# Patient Record
Sex: FEMALE | Race: WHITE | NOT HISPANIC OR LATINO | Employment: FULL TIME | URBAN - METROPOLITAN AREA
[De-identification: names, ages, dates, MRNs, and addresses within clinical notes are randomized per-mention and may not be internally consistent; named-entity substitution may affect disease eponyms.]

---

## 2023-05-11 ENCOUNTER — APPOINTMENT (OUTPATIENT)
Dept: LAB | Facility: LAB | Age: 33
End: 2023-05-11
Payer: COMMERCIAL

## 2023-05-12 LAB
ANA PATTERN: ABNORMAL
ANA TITER: ABNORMAL
ANTI-CENTROMERE: 0.4 AI
ANTI-CHROMATIN: <0.2 AI
ANTI-DNA (DS): <1 IU/ML
ANTI-JO-1 IGG: <0.2 AI
ANTI-NUCLEAR ANTIBODY (ANA): POSITIVE
ANTI-RIBOSOMAL P: <0.2 AI
ANTI-RNP: <0.2 AI
ANTI-SCL-70: <0.2 AI
ANTI-SM/RNP: <0.2 AI
ANTI-SM: <0.2 AI
ANTI-SSA: <0.2 AI
ANTI-SSB: <0.2 AI
COMPLEMENT C3 (MG/DL) IN SER/PLAS: 140 MG/DL (ref 87–200)
COMPLEMENT C4 (MG/DL) IN SER/PLAS: 38 MG/DL (ref 10–50)

## 2023-05-19 LAB
BASOPHILS (10*3/UL) IN BLOOD BY AUTOMATED COUNT: 0.05 X10E9/L (ref 0–0.1)
BASOPHILS/100 LEUKOCYTES IN BLOOD BY AUTOMATED COUNT: 0.9 % (ref 0–2)
EOSINOPHILS (10*3/UL) IN BLOOD BY AUTOMATED COUNT: 0.1 X10E9/L (ref 0–0.7)
EOSINOPHILS/100 LEUKOCYTES IN BLOOD BY AUTOMATED COUNT: 1.7 % (ref 0–6)
ERYTHROCYTE DISTRIBUTION WIDTH (RATIO) BY AUTOMATED COUNT: 11.9 % (ref 11.5–14.5)
ERYTHROCYTE MEAN CORPUSCULAR HEMOGLOBIN CONCENTRATION (G/DL) BY AUTOMATED: 32.6 G/DL (ref 32–36)
ERYTHROCYTE MEAN CORPUSCULAR VOLUME (FL) BY AUTOMATED COUNT: 97 FL (ref 80–100)
ERYTHROCYTES (10*6/UL) IN BLOOD BY AUTOMATED COUNT: 4.13 X10E12/L (ref 4–5.2)
HEMATOCRIT (%) IN BLOOD BY AUTOMATED COUNT: 40.2 % (ref 36–46)
HEMOGLOBIN (G/DL) IN BLOOD: 13.1 G/DL (ref 12–16)
IMMATURE GRANULOCYTES/100 LEUKOCYTES IN BLOOD BY AUTOMATED COUNT: 0.9 % (ref 0–0.9)
LEUKOCYTES (10*3/UL) IN BLOOD BY AUTOMATED COUNT: 5.8 X10E9/L (ref 4.4–11.3)
LYMPHOCYTES (10*3/UL) IN BLOOD BY AUTOMATED COUNT: 2.38 X10E9/L (ref 1.2–4.8)
LYMPHOCYTES/100 LEUKOCYTES IN BLOOD BY AUTOMATED COUNT: 41.4 % (ref 13–44)
MONOCYTES (10*3/UL) IN BLOOD BY AUTOMATED COUNT: 0.45 X10E9/L (ref 0.1–1)
MONOCYTES/100 LEUKOCYTES IN BLOOD BY AUTOMATED COUNT: 7.8 % (ref 2–10)
NEUTROPHILS (10*3/UL) IN BLOOD BY AUTOMATED COUNT: 2.72 X10E9/L (ref 1.2–7.7)
NEUTROPHILS/100 LEUKOCYTES IN BLOOD BY AUTOMATED COUNT: 47.3 % (ref 40–80)
PLATELETS (10*3/UL) IN BLOOD AUTOMATED COUNT: 243 X10E9/L (ref 150–450)
RBC MORPHOLOGY IN BLOOD: NORMAL

## 2024-01-10 RX ORDER — FLUTICASONE PROPIONATE 50 MCG
SPRAY, SUSPENSION (ML) NASAL 2 TIMES DAILY
COMMUNITY
Start: 2022-10-23 | End: 2024-06-10 | Stop reason: WASHOUT

## 2024-01-10 RX ORDER — DROSPIRENONE AND ETHINYL ESTRADIOL 0.03MG-3MG
KIT ORAL
COMMUNITY
Start: 2023-04-24

## 2024-01-10 RX ORDER — ESCITALOPRAM OXALATE 10 MG/1
10 TABLET ORAL DAILY
COMMUNITY

## 2024-01-10 RX ORDER — PERPHENAZINE 2 MG
TABLET ORAL
COMMUNITY

## 2024-01-12 ENCOUNTER — LAB (OUTPATIENT)
Dept: LAB | Facility: LAB | Age: 34
End: 2024-01-12
Payer: COMMERCIAL

## 2024-01-12 ENCOUNTER — OFFICE VISIT (OUTPATIENT)
Dept: RHEUMATOLOGY | Facility: CLINIC | Age: 34
End: 2024-01-12
Payer: COMMERCIAL

## 2024-01-12 VITALS
TEMPERATURE: 98.1 F | BODY MASS INDEX: 23.32 KG/M2 | DIASTOLIC BLOOD PRESSURE: 80 MMHG | HEART RATE: 66 BPM | SYSTOLIC BLOOD PRESSURE: 127 MMHG | HEIGHT: 65 IN | WEIGHT: 140 LBS

## 2024-01-12 DIAGNOSIS — M32.9 SYSTEMIC LUPUS ERYTHEMATOSUS, UNSPECIFIED SLE TYPE, UNSPECIFIED ORGAN INVOLVEMENT STATUS (MULTI): ICD-10-CM

## 2024-01-12 DIAGNOSIS — M32.9 SYSTEMIC LUPUS ERYTHEMATOSUS, UNSPECIFIED SLE TYPE, UNSPECIFIED ORGAN INVOLVEMENT STATUS (MULTI): Primary | ICD-10-CM

## 2024-01-12 PROCEDURE — 1036F TOBACCO NON-USER: CPT | Performed by: INTERNAL MEDICINE

## 2024-01-12 PROCEDURE — 99214 OFFICE O/P EST MOD 30 MIN: CPT | Performed by: INTERNAL MEDICINE

## 2024-01-12 PROCEDURE — 81003 URINALYSIS AUTO W/O SCOPE: CPT

## 2024-01-12 RX ORDER — ALBUTEROL SULFATE 90 UG/1
2 AEROSOL, METERED RESPIRATORY (INHALATION) EVERY 6 HOURS PRN
COMMUNITY

## 2024-01-12 RX ORDER — HYDROXYCHLOROQUINE SULFATE 200 MG/1
200 TABLET, FILM COATED ORAL DAILY
Qty: 90 TABLET | Refills: 3 | Status: SHIPPED | OUTPATIENT
Start: 2024-01-12 | End: 2025-01-11

## 2024-01-12 ASSESSMENT — PAIN SCALES - GENERAL: PAINLEVEL: 0-NO PAIN

## 2024-01-13 LAB
APPEARANCE UR: CLEAR
BILIRUB UR STRIP.AUTO-MCNC: NEGATIVE MG/DL
COLOR UR: ABNORMAL
GLUCOSE UR STRIP.AUTO-MCNC: NEGATIVE MG/DL
KETONES UR STRIP.AUTO-MCNC: ABNORMAL MG/DL
LEUKOCYTE ESTERASE UR QL STRIP.AUTO: NEGATIVE
NITRITE UR QL STRIP.AUTO: NEGATIVE
PH UR STRIP.AUTO: 7 [PH]
PROT UR STRIP.AUTO-MCNC: NEGATIVE MG/DL
RBC # UR STRIP.AUTO: NEGATIVE /UL
SP GR UR STRIP.AUTO: 1.01
UROBILINOGEN UR STRIP.AUTO-MCNC: <2 MG/DL

## 2024-01-17 NOTE — PROGRESS NOTES
Informants: Patient and EMR.  PP: 33 year-old female with history of hypothyroidism, Raynaud's phenomena, COVID virus infection (12/2021, and 7/2022), gastric ulcer.  CC: Generalized joint pain.  Solomon: She had been in her usual state of health until approximately 2 years ago when she developed sore throat. She was initially evaluated in the emergency department 10/23/2020 and found to have a negative COVID virus test and negative test for strep throat. She was treated with cough medicine. She was later prescribed a steroid and an inhaler with improvement in the sore throat but she developed diffuse musculoskeletal pain. She has not noted any joint swelling or rashes. However she has a longstanding history of Raynaud's symptoms, neck stiffness, intermittent abdominal discomfort. She has not noted any history of photosensitivity, oral or genital ulcerations, any recent fever or chills, or uveitis symptoms. She has not noted any dry eyes or dry mouth. The musculoskeletal pains tend to recur sporadically and may involve various joints including the hands, knees, shoulders, and more recently involving the distal lower extremities from the knees to her feet. She had laboratory testing that demonstrated a positive KASSY 1: 160.  She apparently noted worsening of the musculoskeletal pains since 11/2023 with some swelling and some of the digits of both hands as well as increased pain involving various joints.  She has morning stiffness.  She has some lower back pain that has been chronic but manageable.  Repeat laboratory testing demonstrated a positive KASSY 1: 640.    PH: Allergies: No known drug allergies; illnesses: Hypothyroidism, gastric ulcer, COVID virus pneumonia (12/20/2021 and 7/20/2022), Raynaud's phenomena, anxiety, history of jaundice; surgeries: Cholecystectomy (2009).  SH: She denies any tobacco, alcohol, or illicit drug use. She is G3, P2 with 1 early gestational miscarriage.  FH: Father has history of  peripheral neuropathy, spinal stenosis, status post lumbar fusion and total knee arthroplasty. Her mother has history of stroke, hyperthyroidism, hypothyroidism, Raynaud's phenomenon, lupus, osteopenia. She has a sister with migraine headaches. She does have a sister who is healthy. She has a son and a daughter both of whom are healthy. She has a maternal grandmother with heart disease. She has a niece with hereditary kidney disease.   PX: She is a well-developed, well-nourished, thin white female. The lungs, heart, abdomen, and extremities are benign. The musculoskeletal examination shows preserved range of motion of the upper and lower extremity joints without joint effusions.There is some soft tissue thickening involving the PIP joint of some of the digits of both hands and slightly involving the MCP joint of the digits of both hands.  There is some tenderness over the lower back and knees. There is tightness in the supraspinatus muscles bilaterally. There is a slight scoliosis of the thoracolumbar spine. The slump test is normal. There is normal muscle strength in the upper and lower extremities.  X-ray lumbar spine (6/26/2019) mild rotary dextroscoliosis. There is mild disc space narrowing at several levels with mild facet hypertrophy involving the lower lumbar spine. There is a surgical clip in the right mid abdomen.  X-ray left hip (6/26/2019) normal.  Laboratory (12/5/2023) KASSY 1: 640, ESR 47, CRP 2.2, WBC 7.38, hemoglobin 13.1, hematocrit 38.8, MCV 94.6, MCHC 33.8, platelets 266,CCP <1, G6PD 227, RF <10, KASSY 1: 40, MUKESH panel negative, (11/26/2022) (11/8/2022) mono negative, Lyme IgG negative, Lyme IgM negative, KASSY 1: 160.  Impression: 33 year-old white female with history of hypothyroidism, Raynaud's phenomena, anxiety, history of COVID virus infection on 2 occasions, family history of Raynaud's phenomena, thyroid problems, lupus, presenting after developing rather diffuse musculoskeletal pains following  developing significant mouth and throat soreness and positive KASSY consistent with autoimmune disorder likely systemic lupus erythematosus with or without rheumatoid arthritis.  Plan: She is to start hydroxychloroquine 200 mg daily.  She is to have laboratory for beta-2 glycoprotein, cardiolipin antibody, ESR, and urinalysis.  She is to return at the next available office appointment.

## 2024-02-19 ENCOUNTER — LAB (OUTPATIENT)
Dept: LAB | Facility: LAB | Age: 34
End: 2024-02-19
Payer: COMMERCIAL

## 2024-02-19 DIAGNOSIS — M32.9 SYSTEMIC LUPUS ERYTHEMATOSUS, UNSPECIFIED (MULTI): Primary | ICD-10-CM

## 2024-02-19 LAB
CHOLEST SERPL-MCNC: 200 MG/DL (ref 0–199)
CHOLESTEROL/HDL RATIO: 3.3
ERYTHROCYTE [SEDIMENTATION RATE] IN BLOOD BY WESTERGREN METHOD: 6 MM/H (ref 0–20)
HDLC SERPL-MCNC: 60.3 MG/DL
LDLC SERPL CALC-MCNC: 99 MG/DL
NON HDL CHOLESTEROL: 140 MG/DL (ref 0–149)
TRIGL SERPL-MCNC: 204 MG/DL (ref 0–149)
VLDL: 41 MG/DL (ref 0–40)

## 2024-02-19 PROCEDURE — 80061 LIPID PANEL: CPT

## 2024-02-19 PROCEDURE — 36415 COLL VENOUS BLD VENIPUNCTURE: CPT

## 2024-02-19 PROCEDURE — 86147 CARDIOLIPIN ANTIBODY EA IG: CPT

## 2024-02-19 PROCEDURE — 86146 BETA-2 GLYCOPROTEIN ANTIBODY: CPT

## 2024-02-19 PROCEDURE — 87624 HPV HI-RISK TYP POOLED RSLT: CPT

## 2024-02-19 PROCEDURE — 88175 CYTOPATH C/V AUTO FLUID REDO: CPT

## 2024-02-19 PROCEDURE — 85652 RBC SED RATE AUTOMATED: CPT

## 2024-02-20 ENCOUNTER — LAB REQUISITION (OUTPATIENT)
Dept: LAB | Facility: HOSPITAL | Age: 34
End: 2024-02-20
Payer: COMMERCIAL

## 2024-02-20 DIAGNOSIS — Z11.51 ENCOUNTER FOR SCREENING FOR HUMAN PAPILLOMAVIRUS (HPV): ICD-10-CM

## 2024-02-20 DIAGNOSIS — Z01.419 ENCOUNTER FOR GYNECOLOGICAL EXAMINATION (GENERAL) (ROUTINE) WITHOUT ABNORMAL FINDINGS: ICD-10-CM

## 2024-02-20 LAB
B2 GLYCOPROT1 IGA SER-ACNC: <0.6 U/ML
B2 GLYCOPROT1 IGG SER-ACNC: <1.4 U/ML
B2 GLYCOPROT1 IGM SER-ACNC: 1.5 U/ML
CARDIOLIPIN IGA SERPL-ACNC: <0.5 APL U/ML
CARDIOLIPIN IGG SER IA-ACNC: <1.6 GPL U/ML
CARDIOLIPIN IGM SER IA-ACNC: 0.7 MPL U/ML

## 2024-02-29 LAB
CYTOLOGY CMNT CVX/VAG CYTO-IMP: NORMAL
HPV HR 12 DNA GENITAL QL NAA+PROBE: NEGATIVE
HPV HR GENOTYPES PNL CVX NAA+PROBE: NEGATIVE
HPV16 DNA SPEC QL NAA+PROBE: NEGATIVE
HPV18 DNA SPEC QL NAA+PROBE: NEGATIVE
LAB AP HPV GENOTYPE QUESTION: YES
LAB AP HPV HR: NORMAL
LABORATORY COMMENT REPORT: NORMAL
PATH REPORT.TOTAL CANCER: NORMAL

## 2024-06-10 ENCOUNTER — OFFICE VISIT (OUTPATIENT)
Dept: RHEUMATOLOGY | Facility: CLINIC | Age: 34
End: 2024-06-10
Payer: COMMERCIAL

## 2024-06-10 VITALS
BODY MASS INDEX: 22.99 KG/M2 | WEIGHT: 138 LBS | SYSTOLIC BLOOD PRESSURE: 123 MMHG | HEART RATE: 56 BPM | TEMPERATURE: 98.2 F | DIASTOLIC BLOOD PRESSURE: 75 MMHG | HEIGHT: 65 IN

## 2024-06-10 DIAGNOSIS — M32.9 SYSTEMIC LUPUS ERYTHEMATOSUS, UNSPECIFIED SLE TYPE, UNSPECIFIED ORGAN INVOLVEMENT STATUS (MULTI): Primary | ICD-10-CM

## 2024-06-10 PROCEDURE — 99214 OFFICE O/P EST MOD 30 MIN: CPT | Performed by: INTERNAL MEDICINE

## 2024-06-10 RX ORDER — OLOPATADINE HYDROCHLORIDE 1 MG/ML
SOLUTION/ DROPS OPHTHALMIC
COMMUNITY
Start: 2024-05-09

## 2024-06-10 RX ORDER — BUDESONIDE 90 UG/1
2 AEROSOL, POWDER RESPIRATORY (INHALATION) 2 TIMES DAILY
COMMUNITY
Start: 2024-05-14

## 2024-06-10 RX ORDER — TRIAMCINOLONE ACETONIDE 1 MG/G
OINTMENT TOPICAL
COMMUNITY
Start: 2024-05-09

## 2024-06-10 RX ORDER — TRIAMCINOLONE ACETONIDE 55 UG/1
SPRAY, METERED NASAL
COMMUNITY
Start: 2024-05-09

## 2024-06-10 ASSESSMENT — PAIN SCALES - GENERAL: PAINLEVEL: 3

## 2024-06-10 NOTE — PROGRESS NOTES
She complains of having small slightly bumpy rash on her arms and trunk occurring 3 days ago and have been gradually decreasing.  She had pneumococcal vaccination done 4 days ago.  The following day she developed a rash.  She has been having tenderness in the occipital scalp as well as discomfort behind her eyes without any vision disturbance.  The scalp tenderness and discomfort have been present for prolonged period of time.  There is no change in the or occipital scalp symptoms with neck movement.  She has not noted any shortness of breath or angioedema symptoms. She continues to take hydroxychloroquine 200 mg 3 days/week.      There is no tenderness over the frontal region.  There is slight tenderness in the right occipital region.  The sclera not injected.  There is normal active range of motion of the neck.  There is normal passive range of motion of the upper and lower extremity joints without joint effusions.  The integument is remarkable for multiple tiny slightly erythematous papules on the abdomen and sparsely scattered on the forearms.  Tiny areas of faint erythema on the anterior aspect of the hard palate.  The lungs, heart, abdomen, and extremities are benign.    Laboratory (5/9/2024) WBC 4.46, hemoglobin 11.4, hematocrit 34.7, MCV 94.0, MCHC 36.9, platelets 198, BUN 9, creatinine 0.72, glucose 92, calcium 9.3, AST 27, ALT 20, alk phos 639, albumin 4.2, CRP <0.5.    She has history of hypothyroidism, COVID virus infection (12/20/2021 and 7/20/2022), remote gastric ulcer, positive KASSY with negative MUKESH panel, hypercholesterolemia.  She has left foot bulbar discomfort as well as tenderness in the posterior scalp question possible cervical radiculopathy or cervical muscle strain.  She has systemic lupus erythematosus with sun sensitivity, positive KASSY. Question allergic reaction to the pneumococcal vaccine.    She is to do stretching exercises to the neck.  She is to continue hydroxychloroquine 200 mg  Monday Wednesday and Friday.  Consider radiographs of the neck and head CT scan if persists.  She is to return if next available office appointment.

## 2024-07-15 ENCOUNTER — LAB (OUTPATIENT)
Dept: LAB | Facility: LAB | Age: 34
End: 2024-07-15
Payer: COMMERCIAL

## 2024-07-15 DIAGNOSIS — B99.9 UNSPECIFIED INFECTIOUS DISEASE: Primary | ICD-10-CM

## 2024-07-15 PROCEDURE — 36415 COLL VENOUS BLD VENIPUNCTURE: CPT

## 2024-07-15 PROCEDURE — 86317 IMMUNOASSAY INFECTIOUS AGENT: CPT

## 2024-07-18 LAB
S PN DA SERO 19F IGG SER-MCNC: 22.79 UG/ML
S PNEUM DA 1 IGG SER-MCNC: 11.81 UG/ML
S PNEUM DA 10A IGG SER-MCNC: >29.06 UG/ML
S PNEUM DA 11A IGG SER-MCNC: >3.45 UG/ML
S PNEUM DA 12F IGG SER-MCNC: 0.91 UG/ML
S PNEUM DA 14 IGG SER-MCNC: >35.84 UG/ML
S PNEUM DA 15B IGG SER-MCNC: 13.76 UG/ML
S PNEUM DA 17F IGG SER-MCNC: >11.68 UG/ML
S PNEUM DA 18C IGG SER-MCNC: 3.58 UG/ML
S PNEUM DA 19A IGG SER-MCNC: 16.03 UG/ML
S PNEUM DA 2 IGG SER-MCNC: >27.3 UG/ML
S PNEUM DA 20A IGG SER-MCNC: >19.06 UG/ML
S PNEUM DA 22F IGG SER-MCNC: 0.93 UG/ML
S PNEUM DA 23F IGG SER-MCNC: 4.47 UG/ML
S PNEUM DA 3 IGG SER-MCNC: 5.05 UG/ML
S PNEUM DA 33F IGG SER-MCNC: 3.95 UG/ML
S PNEUM DA 4 IGG SER-MCNC: 4.88 UG/ML
S PNEUM DA 5 IGG SER-MCNC: 3.81 UG/ML
S PNEUM DA 6B IGG SER-MCNC: 1.67 UG/ML
S PNEUM DA 7F IGG SER-MCNC: >27.96 UG/ML
S PNEUM DA 8 IGG SER-MCNC: 3.02 UG/ML
S PNEUM DA 9N IGG SER-MCNC: 9.56 UG/ML
S PNEUM DA 9V IGG SER-MCNC: 9.61 UG/ML
S PNEUM SEROTYPE IGG SER-IMP: NORMAL

## 2024-10-16 ENCOUNTER — LAB (OUTPATIENT)
Dept: LAB | Facility: LAB | Age: 34
End: 2024-10-16
Payer: COMMERCIAL

## 2024-10-16 DIAGNOSIS — M32.9 SYSTEMIC LUPUS ERYTHEMATOSUS, UNSPECIFIED SLE TYPE, UNSPECIFIED ORGAN INVOLVEMENT STATUS (MULTI): ICD-10-CM

## 2024-10-16 LAB
BASOPHILS # BLD AUTO: 0.05 X10*3/UL (ref 0–0.1)
BASOPHILS NFR BLD AUTO: 0.9 %
CRP SERPL-MCNC: 0.3 MG/DL
EOSINOPHIL # BLD AUTO: 0.13 X10*3/UL (ref 0–0.7)
EOSINOPHIL NFR BLD AUTO: 2.3 %
ERYTHROCYTE [DISTWIDTH] IN BLOOD BY AUTOMATED COUNT: 11.4 % (ref 11.5–14.5)
HCT VFR BLD AUTO: 36.6 % (ref 36–46)
HGB BLD-MCNC: 12 G/DL (ref 12–16)
IMM GRANULOCYTES # BLD AUTO: 0.01 X10*3/UL (ref 0–0.7)
IMM GRANULOCYTES NFR BLD AUTO: 0.2 % (ref 0–0.9)
LYMPHOCYTES # BLD AUTO: 2.21 X10*3/UL (ref 1.2–4.8)
LYMPHOCYTES NFR BLD AUTO: 38.8 %
MCH RBC QN AUTO: 31.7 PG (ref 26–34)
MCHC RBC AUTO-ENTMCNC: 32.8 G/DL (ref 32–36)
MCV RBC AUTO: 97 FL (ref 80–100)
MONOCYTES # BLD AUTO: 0.49 X10*3/UL (ref 0.1–1)
MONOCYTES NFR BLD AUTO: 8.6 %
NEUTROPHILS # BLD AUTO: 2.8 X10*3/UL (ref 1.2–7.7)
NEUTROPHILS NFR BLD AUTO: 49.2 %
NRBC BLD-RTO: 0 /100 WBCS (ref 0–0)
PLATELET # BLD AUTO: 217 X10*3/UL (ref 150–450)
RBC # BLD AUTO: 3.78 X10*6/UL (ref 4–5.2)
WBC # BLD AUTO: 5.7 X10*3/UL (ref 4.4–11.3)

## 2024-10-16 PROCEDURE — 86140 C-REACTIVE PROTEIN: CPT

## 2024-10-16 PROCEDURE — 36415 COLL VENOUS BLD VENIPUNCTURE: CPT

## 2024-10-16 PROCEDURE — 85025 COMPLETE CBC W/AUTO DIFF WBC: CPT

## 2024-10-16 PROCEDURE — 86235 NUCLEAR ANTIGEN ANTIBODY: CPT

## 2024-10-16 PROCEDURE — 86038 ANTINUCLEAR ANTIBODIES: CPT

## 2024-10-16 PROCEDURE — 86225 DNA ANTIBODY NATIVE: CPT

## 2024-10-17 LAB
ANA PATTERN: ABNORMAL
ANA SER QL HEP2 SUBST: POSITIVE
ANA TITR SER IF: ABNORMAL {TITER}
CENTROMERE B AB SER-ACNC: 0.2 AI
CHROMATIN AB SERPL-ACNC: <0.2 AI
DSDNA AB SER-ACNC: <1 IU/ML
ENA JO1 AB SER QL IA: <0.2 AI
ENA RNP AB SER IA-ACNC: <0.2 AI
ENA SCL70 AB SER QL IA: <0.2 AI
ENA SM AB SER IA-ACNC: <0.2 AI
ENA SM+RNP AB SER QL IA: <0.2 AI
ENA SS-A AB SER IA-ACNC: <0.2 AI
ENA SS-B AB SER IA-ACNC: <0.2 AI
RIBOSOMAL P AB SER-ACNC: <0.2 AI

## 2024-12-23 ENCOUNTER — APPOINTMENT (OUTPATIENT)
Dept: RHEUMATOLOGY | Facility: CLINIC | Age: 34
End: 2024-12-23
Payer: COMMERCIAL

## 2024-12-23 VITALS
HEART RATE: 62 BPM | TEMPERATURE: 98.3 F | DIASTOLIC BLOOD PRESSURE: 81 MMHG | BODY MASS INDEX: 22.76 KG/M2 | HEIGHT: 65 IN | WEIGHT: 136.6 LBS | SYSTOLIC BLOOD PRESSURE: 135 MMHG | OXYGEN SATURATION: 98 %

## 2024-12-23 DIAGNOSIS — M32.9 SYSTEMIC LUPUS ERYTHEMATOSUS, UNSPECIFIED SLE TYPE, UNSPECIFIED ORGAN INVOLVEMENT STATUS (MULTI): ICD-10-CM

## 2024-12-23 PROCEDURE — 3008F BODY MASS INDEX DOCD: CPT | Performed by: INTERNAL MEDICINE

## 2024-12-23 PROCEDURE — 99213 OFFICE O/P EST LOW 20 MIN: CPT | Performed by: INTERNAL MEDICINE

## 2024-12-23 RX ORDER — HYDROXYCHLOROQUINE SULFATE 200 MG/1
200 TABLET, FILM COATED ORAL DAILY
Qty: 90 TABLET | Refills: 3 | Status: SHIPPED | OUTPATIENT
Start: 2024-12-23 | End: 2025-12-23

## 2024-12-23 ASSESSMENT — PAIN SCALES - GENERAL: PAINLEVEL_OUTOF10: 0-NO PAIN

## 2024-12-23 NOTE — PROGRESS NOTES
For follow-up evaluation with complaints of some discomfort in the base of the left first toe with standing or walking.  She also notes some discomfort in the fourth digit of the right hand in the mornings with locking sensation.  2 months ago in 10/2024, she had episode of right upper chest pain as well as mouth and nose sores that lasted for 2 weeks then subsided without any additional therapy.  She has had similar symptoms approximately 1 year earlier.  She does not recall left chest pain.  The relationship to changes in posture or changes in respiration.    She has a thin body habitus.  The lungs, heart, abdomen, and extremities are benign.  The musculoskeletal examination shows good passive range of motion of the upper and lower extremity joints without joint effusions.  There is slight tenderness at the PIP joint of the fourth digit of the right hand without locking or triggering currently.  There is slight tenderness at the left first MTP joint.  There is hyper extension of the digits of both hands.  There is no skin laxity.    Laboratory (10/16/2024) KASSY 1: 80, MUKESH panel negative, WBC 5.7, hemoglobin 12.0, hematocrit 36.6, MCV 97, MCHC 32.8, platelets 217, CRP 0.30.    She has history of hypothyroidism, COVID virus infection (12/20/2021 and 7/20/2022), remote gastric ulcer, systemic lupus with sun sensitivity and positive KASSY with negative MUKESH panel, mucosal ulcerations, and hypercholesterolemia.    She is to continue hydroxychloroquine 200 mg Monday Wednesday and Friday.  She is to return at the next available office appointment.  She is to have ophthalmology evaluation for follow-up of hydroxychloroquine therapy.

## 2025-04-16 ENCOUNTER — APPOINTMENT (OUTPATIENT)
Facility: CLINIC | Age: 35
End: 2025-04-16
Payer: COMMERCIAL

## 2025-04-16 VITALS
DIASTOLIC BLOOD PRESSURE: 80 MMHG | SYSTOLIC BLOOD PRESSURE: 126 MMHG | HEIGHT: 65 IN | BODY MASS INDEX: 21.99 KG/M2 | WEIGHT: 132 LBS

## 2025-04-16 DIAGNOSIS — G43.831 INTRACTABLE MENSTRUAL MIGRAINE WITH STATUS MIGRAINOSUS: ICD-10-CM

## 2025-04-16 DIAGNOSIS — Z01.419 WELL WOMAN EXAM WITH ROUTINE GYNECOLOGICAL EXAM: Primary | ICD-10-CM

## 2025-04-16 PROCEDURE — 1036F TOBACCO NON-USER: CPT | Performed by: OBSTETRICS & GYNECOLOGY

## 2025-04-16 PROCEDURE — 99395 PREV VISIT EST AGE 18-39: CPT | Performed by: OBSTETRICS & GYNECOLOGY

## 2025-04-16 PROCEDURE — 3008F BODY MASS INDEX DOCD: CPT | Performed by: OBSTETRICS & GYNECOLOGY

## 2025-04-16 RX ORDER — ETONOGESTREL AND ETHINYL ESTRADIOL VAGINAL RING .015; .12 MG/D; MG/D
1 RING VAGINAL
Qty: 17 EACH | Refills: 0 | Status: SHIPPED | OUTPATIENT
Start: 2025-04-16 | End: 2026-04-16

## 2025-04-16 ASSESSMENT — COLUMBIA-SUICIDE SEVERITY RATING SCALE - C-SSRS
6. HAVE YOU EVER DONE ANYTHING, STARTED TO DO ANYTHING, OR PREPARED TO DO ANYTHING TO END YOUR LIFE?: NO
2. HAVE YOU ACTUALLY HAD ANY THOUGHTS OF KILLING YOURSELF?: NO
1. IN THE PAST MONTH, HAVE YOU WISHED YOU WERE DEAD OR WISHED YOU COULD GO TO SLEEP AND NOT WAKE UP?: NO

## 2025-04-16 ASSESSMENT — PATIENT HEALTH QUESTIONNAIRE - PHQ9
1. LITTLE INTEREST OR PLEASURE IN DOING THINGS: NOT AT ALL
2. FEELING DOWN, DEPRESSED OR HOPELESS: NOT AT ALL
SUM OF ALL RESPONSES TO PHQ9 QUESTIONS 1 AND 2: 0

## 2025-04-16 NOTE — PROGRESS NOTES
Annual [facility] Women History and Physical  Subjective   Mandy Bruce is a 35 y.o. female who is here for a routine exam. Periods are  absent 2/2 continous therapy , she does have migraines without aura, and will get a migraine if she misses a pill.  She is interested in potentially trying nuvaring to avoid this  No intermenstrual bleeding, spotting, or discharge.  She is sexually active and uses birthcontrol: a sterilization procedure Vasectomy for pregnancy prevention  She currently  has spotting x 2 wks on continuous christiane.   [facility] Women Screening history:  Cervical  2024 NORMAL, HPV NEG  Breast  NONE  Colon  No FH    Obstetrical History   OB History    Para Term  AB Living   3 2 2  1 2   SAB IAB Ectopic Multiple Live Births   1    2      # Outcome Date GA Lbr Cm/2nd Weight Sex Type Anes PTL Lv   3 Term 05/15/20 40w0d  3.118 kg M Vag-Spont EPI N NOAH   2 Term 18 40w0d  3.629 kg F Vag-Spont EPI  NOAH   1 SAB                Past Medical History  Past Medical History:   Diagnosis Date    Low back pain     Lupus     Lupus anticoagulant disorder (Multi)     Tendinitis ?        Past Surgical History   Past Surgical History:   Procedure Laterality Date    CHOLECYSTECTOMY         Social History  Social History     Tobacco Use    Smoking status: Never    Smokeless tobacco: Never   Substance Use Topics    Alcohol use: Never     Substance and Sexual Activity   Drug Use Never       Allergies  House dust and Mold     Medications  Current Outpatient Medications on File Prior to Visit   Medication Sig Dispense Refill    albuterol 90 mcg/actuation inhaler Inhale 2 puffs every 6 hours if needed for wheezing.      drospirenone-ethinyl estradioL (Christiane, Ocella) 3-0.03 mg tablet 1 (one) Tablet daily, skipping placebos. pt takes continuously      escitalopram (Lexapro) 10 mg tablet Take 1 tablet (10 mg) by mouth once daily.      fish,bora,flax oils-om3,6,9no1 (Omega 3-6-9) 1,200 mg capsule Take by mouth.       "hydroxychloroquine (Plaquenil) 200 mg tablet Take 1 tablet (200 mg) by mouth once daily. 90 tablet 3    olopatadine (Patanol) 0.1 % ophthalmic solution instill 1 drop into each eye TWICE DAILY      triamcinolone (Kenalog) 0.1 % ointment APPLY TO THE AFFECTED AREA(S) TWICE DAILY AS NEEDED -- do not use more than 14 consecutive days      triamcinolone (Nasacort) 55 mcg nasal inhaler use 2 (TWO) sprays nasally daily      [DISCONTINUED] Pulmicort Flexhaler 90 mcg/actuation inhaler Inhale 2 puffs 2 times a day.       No current facility-administered medications on file prior to visit.     REVIEW OF SYSTEMS  Constitutional: no fever, no chills, no recent weight gain, no recent weight loss and no fatigue.   Eyes: no eye pain, no vision problems and no dryness of the eyes.   ENT: no hearing loss, no nosebleeds and no sinus congestion.   Cardiovascular: no chest pain, no palpitations and no orthopnea.   Respiratory: no shortness of breath, no cough and no wheezing.   Gastrointestinal: no abdominal pain, no constipation, no nausea, no diarrhea and no vomiting.   Genitourinary: no dysuria, no urinary incontinence, no vaginal dryness, no vaginal itching, no dyspareunia, no pelvic pain, no dysmenorrhea, no sexual problems, no change in urinary frequency, no vaginal discharge, no unexplained vaginal bleeding and no lesion/sore.   Musculoskeletal: no back pain, no joint swelling and no leg edema.   Integumentary: no rashes, no skin lesions, no nipple discharge, no breast pain and no breast lump.   Neurological: no headache, no numbness and no dizziness.   Psychiatric: no sleep disturbances, no anxiety and no depression.   Endocrine: no hot flashes, no loss of hair and no hirsutism.   Hematologic/Lymphatic: no swollen glands, no tendency for easy bleeding and no tendency for easy bruising.       Objective   PHYSICAL EXAM  /80   Ht 1.651 m (5' 5\")   Wt 59.9 kg (132 lb)   LMP 04/01/2025 (Approximate)   BMI 21.97 kg/m² "     General:   Psychiatric:  Alert and oriented x 3   Appropriate mood and affect   Heart:  Thyroid: Regular rate, rhythm  Euthyroid, normal shape and size   Lungs:  Breast: Clear to auscultation bilaterally  Symmetrical, no skin changes/nipple discharge, redness, tenderness, no masses palpated bilaterally   Abdomen: Soft, non tender   Vulva: EGBUS normal, no lesions   Vagina: Pink, normal discharge   Cervix: No CMT   Uterus: Normal shape, size   Adnexa:  Perineum/Perianal:  Extremities: NT bilaterally  No skin Lesions, Hemorrhoids  Full ROM, No edema     Assessment/Plan   Mandy Bruce is a 35 y.o. female presents for well women exam and no new problems or risk factors were identified.  Discussed current recommendations for cervical cancer screening and breast cancer screening including self breast awareness and mammography.  Diet , exercise and daily vitamin encouraged.    She uses patrizia for her menstrual migraines, without aura.  She does note spotting if she misses a pill. she is interested in potentially trying nuvaring to avoid this    Diagnoses and all orders for this visit:  Well woman exam with routine gynecological exam  Intractable menstrual migraine with status migrainosus  -     etonogestreL-ethinyl estradioL (Nuvaring) 0.12-0.015 mg/24 hr vaginal ring; Insert 1 each into the vagina every 21 (twenty-one) days. Insert vaginal ring for 3 weeks, continuous therapy      Alfredito Duran MD     Thank you for coming to see me for your visit today and most importantly thank you for having a preventative visit.  If lab work was sent, you will see your test result via [facility].  Feel free to call and discuss results you can do not understand.  Any prescriptions will be sent electronically to your pharmacy listed    I look forward to seeing you next year for your health care needs.  Please remember:   Sleep is important - make it a priority for at least 6 hours per night!   Exercise such as walking for 20  minutes per day is beneficial to your physical and mental health   Healthy habits lead to a healthy life.

## 2025-05-12 DIAGNOSIS — M32.9 SYSTEMIC LUPUS ERYTHEMATOSUS, UNSPECIFIED SLE TYPE, UNSPECIFIED ORGAN INVOLVEMENT STATUS (MULTI): Primary | ICD-10-CM

## 2025-05-12 DIAGNOSIS — R21 RASH: ICD-10-CM

## 2025-05-12 DIAGNOSIS — M32.9 SYSTEMIC LUPUS ERYTHEMATOSUS, UNSPECIFIED SLE TYPE, UNSPECIFIED ORGAN INVOLVEMENT STATUS (MULTI): ICD-10-CM

## 2025-05-12 RX ORDER — PREDNISONE 10 MG/1
10 TABLET ORAL DAILY
Qty: 10 TABLET | Refills: 0 | Status: SHIPPED | OUTPATIENT
Start: 2025-05-12 | End: 2025-05-13 | Stop reason: SDUPTHER

## 2025-05-13 DIAGNOSIS — R21 RASH: ICD-10-CM

## 2025-05-13 LAB
ALBUMIN SERPL-MCNC: 3.9 G/DL (ref 3.6–5.1)
ALP SERPL-CCNC: 48 U/L (ref 31–125)
ALT SERPL-CCNC: 18 U/L (ref 6–29)
ANION GAP SERPL CALCULATED.4IONS-SCNC: 10 MMOL/L (CALC) (ref 7–17)
AST SERPL-CCNC: 17 U/L (ref 10–30)
BASOPHILS # BLD AUTO: 41 CELLS/UL (ref 0–200)
BASOPHILS NFR BLD AUTO: 0.9 %
BILIRUB SERPL-MCNC: 0.3 MG/DL (ref 0.2–1.2)
BUN SERPL-MCNC: 9 MG/DL (ref 7–25)
CALCIUM SERPL-MCNC: 9 MG/DL (ref 8.6–10.2)
CHLORIDE SERPL-SCNC: 104 MMOL/L (ref 98–110)
CO2 SERPL-SCNC: 24 MMOL/L (ref 20–32)
CREAT SERPL-MCNC: 0.74 MG/DL (ref 0.5–0.97)
CRP SERPL-MCNC: 12.6 MG/L
EGFRCR SERPLBLD CKD-EPI 2021: 109 ML/MIN/1.73M2
EOSINOPHIL # BLD AUTO: 81 CELLS/UL (ref 15–500)
EOSINOPHIL NFR BLD AUTO: 1.8 %
ERYTHROCYTE [DISTWIDTH] IN BLOOD BY AUTOMATED COUNT: 11.7 % (ref 11–15)
GLUCOSE SERPL-MCNC: 84 MG/DL (ref 65–99)
HCT VFR BLD AUTO: 34.2 % (ref 35–45)
HGB BLD-MCNC: 11.5 G/DL (ref 11.7–15.5)
LYMPHOCYTES # BLD AUTO: 1445 CELLS/UL (ref 850–3900)
LYMPHOCYTES NFR BLD AUTO: 32.1 %
MCH RBC QN AUTO: 31.7 PG (ref 27–33)
MCHC RBC AUTO-ENTMCNC: 33.6 G/DL (ref 32–36)
MCV RBC AUTO: 94.2 FL (ref 80–100)
MONOCYTES # BLD AUTO: 342 CELLS/UL (ref 200–950)
MONOCYTES NFR BLD AUTO: 7.6 %
NEUTROPHILS # BLD AUTO: 2592 CELLS/UL (ref 1500–7800)
NEUTROPHILS NFR BLD AUTO: 57.6 %
PLATELET # BLD AUTO: 176 THOUSAND/UL (ref 140–400)
PMV BLD REES-ECKER: 11.9 FL (ref 7.5–12.5)
POTASSIUM SERPL-SCNC: 4 MMOL/L (ref 3.5–5.3)
PROT SERPL-MCNC: 6.9 G/DL (ref 6.1–8.1)
RBC # BLD AUTO: 3.63 MILLION/UL (ref 3.8–5.1)
SODIUM SERPL-SCNC: 138 MMOL/L (ref 135–146)
WBC # BLD AUTO: 4.5 THOUSAND/UL (ref 3.8–10.8)

## 2025-05-13 RX ORDER — PREDNISONE 10 MG/1
10 TABLET ORAL DAILY
Qty: 10 TABLET | Refills: 0 | Status: SHIPPED | OUTPATIENT
Start: 2025-05-13 | End: 2025-05-23

## 2025-05-15 LAB
ANA PAT SER IF-IMP: ABNORMAL
ANA SER QL IF: POSITIVE
ANA TITR SER IF: ABNORMAL TITER
C3 SERPL-MCNC: 94 MG/DL (ref 83–193)
C4 SERPL-MCNC: 19 MG/DL (ref 15–57)
CENTROMERE B AB SER-ACNC: ABNORMAL AI
DSDNA AB SER-ACNC: 8 IU/ML
ENA JO1 AB SER IA-ACNC: ABNORMAL AI
ENA RNP AB SER-ACNC: ABNORMAL AI
ENA SCL70 AB SER IA-ACNC: ABNORMAL AI
ENA SM AB SER IA-ACNC: ABNORMAL AI
ENA SM+RNP AB SER IA-ACNC: ABNORMAL AI
ENA SS-A AB SER IA-ACNC: ABNORMAL AI
ENA SS-B AB SER IA-ACNC: ABNORMAL AI
LABORATORY COMMENT REPORT: ABNORMAL
NUCLEOSOME AB SER IA-ACNC: ABNORMAL AI
RIBOSOMAL P AB SER-ACNC: ABNORMAL AI

## 2025-05-15 NOTE — PROGRESS NOTES
Patient contacted via My Chart to make aware of provider's instruction to try Zyrtec or Allegra for skin rash. No other new instruction at this time.

## 2025-07-01 ENCOUNTER — APPOINTMENT (OUTPATIENT)
Dept: RHEUMATOLOGY | Facility: CLINIC | Age: 35
End: 2025-07-01
Payer: COMMERCIAL

## 2025-07-01 VITALS
OXYGEN SATURATION: 98 % | HEART RATE: 62 BPM | SYSTOLIC BLOOD PRESSURE: 142 MMHG | HEIGHT: 66 IN | BODY MASS INDEX: 23.18 KG/M2 | TEMPERATURE: 98.6 F | WEIGHT: 144.2 LBS | DIASTOLIC BLOOD PRESSURE: 84 MMHG

## 2025-07-01 DIAGNOSIS — M32.9 SYSTEMIC LUPUS ERYTHEMATOSUS, UNSPECIFIED SLE TYPE, UNSPECIFIED ORGAN INVOLVEMENT STATUS (MULTI): Primary | ICD-10-CM

## 2025-07-01 PROCEDURE — 99213 OFFICE O/P EST LOW 20 MIN: CPT | Performed by: INTERNAL MEDICINE

## 2025-07-01 PROCEDURE — 1036F TOBACCO NON-USER: CPT | Performed by: INTERNAL MEDICINE

## 2025-07-01 PROCEDURE — 3008F BODY MASS INDEX DOCD: CPT | Performed by: INTERNAL MEDICINE

## 2025-07-01 ASSESSMENT — ENCOUNTER SYMPTOMS
LOSS OF SENSATION IN FEET: 0
OCCASIONAL FEELINGS OF UNSTEADINESS: 0
DEPRESSION: 0

## 2025-07-01 ASSESSMENT — PATIENT HEALTH QUESTIONNAIRE - PHQ9
2. FEELING DOWN, DEPRESSED OR HOPELESS: NOT AT ALL
1. LITTLE INTEREST OR PLEASURE IN DOING THINGS: NOT AT ALL
SUM OF ALL RESPONSES TO PHQ9 QUESTIONS 1 AND 2: 0

## 2025-07-01 ASSESSMENT — PAIN SCALES - GENERAL: PAINLEVEL_OUTOF10: 0-NO PAIN

## 2025-07-04 NOTE — PROGRESS NOTES
She presents for follow-up evaluation with complaints of having joint vibrating sensation in the posterior aspect of the right calf.  She has also noted some episodes of pain in the lower back.  She has not noted any recent rashes or mucosal ulcerations or joint swelling.    Examination shows good passive range of motion of the upper and lower extremity joints without joint effusions.  The straight leg raise test normal bilaterally in the seated position.  There is lower back pain with lumbar extension and slightly with anterior flexion of the lumbar spine.  The extremities are not swollen.  There is no cyanosis or digital ulcers.  Pedal pulses 1+ bilaterally.  There is preserved muscle strength in both lower extremities.    Laboratory (5/12/2025) glucose 84, BUN 9, creatinine 0.74, alkaline phosphatase 48, AST 17, ALT 18, albumin 3.9, calcium 9.0, WBC 4.5, hemoglobin 11.5, hematocrit 34.2, MCV 94.2, MCHC 33.6, platelets 176, CRP 12.6.,  C3 94, C4 19,  KASSY , which means that the name of the other was which means1: 40, DNA 8.     She has symptoms suggestive of a right S1 radiculopathy with paresthesia in the right calf and lower back pain, hypothyroidism, history of COVID virus infection (12/20/2021 and 7/20/2022), history of gastritis, systemic lupus erythematosus with history of positive KASSY, mucosal ulcerations, Raynaud's phenomenon, arthralgia.    Continue hydroxychloroquine 200 mg 3 times per week.  Consider increasing up to 5 days/week if symptoms progress.  She is to do stretching exercises to the lower back.  She is to return at the next available office appointment in 6 months.

## 2025-07-25 ENCOUNTER — TELEPHONE (OUTPATIENT)
Dept: RHEUMATOLOGY | Facility: CLINIC | Age: 35
End: 2025-07-25

## 2025-07-25 DIAGNOSIS — M32.9 SYSTEMIC LUPUS ERYTHEMATOSUS, UNSPECIFIED SLE TYPE, UNSPECIFIED ORGAN INVOLVEMENT STATUS (MULTI): ICD-10-CM

## 2025-07-25 NOTE — PROGRESS NOTES
Patient made aware of new order to be place per provider, Dr. Reyna for gabapentin ramp, over the duration of 3 weeks. Pharmacy of choice confirmed with patient. Encouraged to call/ message office with any concerns.

## 2025-07-28 RX ORDER — GABAPENTIN 100 MG/1
CAPSULE ORAL
Qty: 201 CAPSULE | Refills: 0 | Status: SHIPPED | OUTPATIENT
Start: 2025-07-28 | End: 2025-10-10

## 2026-01-26 ENCOUNTER — APPOINTMENT (OUTPATIENT)
Dept: RHEUMATOLOGY | Facility: CLINIC | Age: 36
End: 2026-01-26
Payer: COMMERCIAL

## 2026-07-15 ENCOUNTER — APPOINTMENT (OUTPATIENT)
Facility: CLINIC | Age: 36
End: 2026-07-15
Payer: COMMERCIAL